# Patient Record
(demographics unavailable — no encounter records)

---

## 2025-07-09 NOTE — PHYSICAL EXAM
[General Appearance - Alert] : alert [General Appearance - In No Acute Distress] : in no acute distress [Oriented To Time, Place, And Person] : oriented to person, place, and time [Motor Tone] : muscle tone was normal in all four extremities [Motor Strength] : muscle strength was normal in all four extremities [Abnormal Walk] : normal gait
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
## 2025-07-18 NOTE — ASSESSMENT
[FreeTextEntry1] : 43yo M status post craniotomy and washout on 6/26/2025 presents for his first postoperative encounter. He is overall doing well, maintained on IV antibiotics via LUE PICC. Staples and sutures removed without concern, no s/s of infection noted. Patient denies any recent fevers, chills, malaise. He remains out of work at this time.   PLAN: CT head and CT sinuses in 3 months  Patient will c/w IV antibiotics as per ID  Return in 3 months after imaging, contact the office sooner if needed  *US of the RLQ to be completed at this time, will call if concerns are noted  Kateryna Lamb MS, FNP-BC Nurse Practitioner [facility]   Todd Burton MD, [facility]  Director, Cerebrovascular & Endovascular Dept. Of Neurosurgery  [facility]

## 2025-07-18 NOTE — ASSESSMENT
[FreeTextEntry1] : 45yo M status post craniotomy and washout on 6/26/2025 presents for his first postoperative encounter. He is overall doing well, maintained on IV antibiotics via LUE PICC. Staples and sutures removed without concern, no s/s of infection noted. Patient denies any recent fevers, chills, malaise. He remains out of work at this time.   PLAN: CT head and CT sinuses in 3 months  Patient will c/w IV antibiotics as per ID  Return in 3 months after imaging, contact the office sooner if needed  *US of the RLQ to be completed at this time, will call if concerns are noted  Kateryna Lamb MS, FNP-BC Nurse Practitioner [facility]   Todd Burton MD, [facility]  Director, Cerebrovascular & Endovascular Dept. Of Neurosurgery  [facility]

## 2025-07-18 NOTE — END OF VISIT
[FreeTextEntry3] :  I, Dr Burton personally performed the evaluation and management (E/M) services for this established patient who presents today with (a) new problem(s)/exacerbation of (an) existing condition(s).  That E/M includes conducting the examination, assessing all new/exacerbated conditions, and establishing a new plan of care.  Today, my AHMET was here to observe my evaluation and management services for this new problem/exacerbated condition to be followed going forward.

## 2025-07-25 NOTE — ASSESSMENT
[FreeTextEntry1] : 43yo M presents for an abdominal wound check, no evidence for infection or abscess, much improved.   PLAN: MRI head w/wo IVC in 4 weeks  Return after, sooner if needed  Kateryna Lamb MS, FNP-BC Nurse Practitioner [facility]   Todd Burton MD, [facility]  Director, Cerebrovascular & Endovascular Dept. Of Neurosurgery  [facility]

## 2025-07-25 NOTE — HISTORY OF PRESENT ILLNESS
[FreeTextEntry1] : Mr. ISAAC is a 44-year-old male presenting to the office today for a wound check.   Last postoperative visit on 7/9/2025, there was noted a RLQ area of induration and concern for either a hematoma or possible infection. We referred the patient for an ultrasound which he completed on 7/16/2025 and it appreciated a 5.0cm collection for possible hematoma versus abscess and recommended a physical examination. He was evaluated today and the site has no evidence of infection or abscess, much improved.   PLAN: MRI head w/wo IVC in 4 weeks  Return after, sooner if needed

## 2025-07-25 NOTE — ASSESSMENT
[FreeTextEntry1] : 43yo M presents for an abdominal wound check, no evidence for infection or abscess, much improved.   PLAN: MRI head w/wo IVC in 4 weeks  Return after, sooner if needed  Kateryna Lamb MS, FNP-BC Nurse Practitioner [facility]   Todd Burton MD, [facility]  Director, Cerebrovascular & Endovascular Dept. Of Neurosurgery  [facility]